# Patient Record
Sex: MALE | Race: WHITE
[De-identification: names, ages, dates, MRNs, and addresses within clinical notes are randomized per-mention and may not be internally consistent; named-entity substitution may affect disease eponyms.]

---

## 2018-09-28 ENCOUNTER — HOSPITAL ENCOUNTER (OUTPATIENT)
Dept: HOSPITAL 6 - RAD | Age: 38
End: 2018-09-28
Attending: FAMILY MEDICINE
Payer: COMMERCIAL

## 2018-09-28 DIAGNOSIS — E04.1: Primary | ICD-10-CM

## 2019-01-14 ENCOUNTER — HOSPITAL ENCOUNTER (OUTPATIENT)
Dept: HOSPITAL 6 - VAS | Age: 39
End: 2019-01-14
Attending: FAMILY MEDICINE
Payer: COMMERCIAL

## 2019-01-14 DIAGNOSIS — I08.1: Primary | ICD-10-CM

## 2019-10-03 ENCOUNTER — HOSPITAL ENCOUNTER (OUTPATIENT)
Dept: HOSPITAL 6 - LAB | Age: 39
End: 2019-10-03
Payer: COMMERCIAL

## 2019-10-03 DIAGNOSIS — R53.83: ICD-10-CM

## 2019-10-03 DIAGNOSIS — L30.9: Primary | ICD-10-CM

## 2019-10-03 DIAGNOSIS — E83.40: ICD-10-CM

## 2019-11-07 ENCOUNTER — HOSPITAL ENCOUNTER (OUTPATIENT)
Dept: HOSPITAL 6 - RAD | Age: 39
End: 2019-11-07
Attending: FAMILY MEDICINE
Payer: COMMERCIAL

## 2019-11-07 DIAGNOSIS — M17.12: Primary | ICD-10-CM

## 2021-05-30 ENCOUNTER — RECORDS - HEALTHEAST (OUTPATIENT)
Dept: ADMINISTRATIVE | Facility: CLINIC | Age: 41
End: 2021-05-30

## 2021-05-31 ENCOUNTER — RECORDS - HEALTHEAST (OUTPATIENT)
Dept: ADMINISTRATIVE | Facility: CLINIC | Age: 41
End: 2021-05-31

## 2021-11-16 ENCOUNTER — APPOINTMENT (OUTPATIENT)
Dept: GENERAL RADIOLOGY | Facility: CLINIC | Age: 41
End: 2021-11-16
Attending: EMERGENCY MEDICINE
Payer: COMMERCIAL

## 2021-11-16 ENCOUNTER — HOSPITAL ENCOUNTER (EMERGENCY)
Facility: CLINIC | Age: 41
Discharge: HOME | End: 2021-11-16
Attending: EMERGENCY MEDICINE | Admitting: EMERGENCY MEDICINE
Payer: COMMERCIAL

## 2021-11-16 VITALS
SYSTOLIC BLOOD PRESSURE: 113 MMHG | BODY MASS INDEX: 25.76 KG/M2 | RESPIRATION RATE: 18 BRPM | HEIGHT: 68 IN | OXYGEN SATURATION: 99 % | TEMPERATURE: 97.6 F | WEIGHT: 170 LBS | DIASTOLIC BLOOD PRESSURE: 73 MMHG | HEART RATE: 73 BPM

## 2021-11-16 DIAGNOSIS — R07.1 CHEST PAIN ON BREATHING: ICD-10-CM

## 2021-11-16 LAB
ANION GAP SERPL CALCULATED.3IONS-SCNC: 5 MMOL/L (ref 3–14)
ATRIAL RATE - MUSE: 77 BPM
BASOPHILS # BLD AUTO: 0 10E3/UL (ref 0–0.2)
BASOPHILS NFR BLD AUTO: 0 %
BUN SERPL-MCNC: 15 MG/DL (ref 7–30)
CALCIUM SERPL-MCNC: 8.9 MG/DL (ref 8.5–10.1)
CHLORIDE BLD-SCNC: 108 MMOL/L (ref 94–109)
CO2 SERPL-SCNC: 26 MMOL/L (ref 20–32)
CREAT SERPL-MCNC: 0.92 MG/DL (ref 0.66–1.25)
DIASTOLIC BLOOD PRESSURE - MUSE: NORMAL MMHG
EOSINOPHIL # BLD AUTO: 0.2 10E3/UL (ref 0–0.7)
EOSINOPHIL NFR BLD AUTO: 2 %
ERYTHROCYTE [DISTWIDTH] IN BLOOD BY AUTOMATED COUNT: 13 % (ref 10–15)
FLUAV RNA SPEC QL NAA+PROBE: NEGATIVE
FLUBV RNA RESP QL NAA+PROBE: NEGATIVE
GFR SERPL CREATININE-BSD FRML MDRD: >90 ML/MIN/1.73M2
GLUCOSE BLD-MCNC: 120 MG/DL (ref 70–99)
HCT VFR BLD AUTO: 45.4 % (ref 40–53)
HGB BLD-MCNC: 14 G/DL (ref 13.3–17.7)
HOLD SPECIMEN: NORMAL
IMM GRANULOCYTES # BLD: 0 10E3/UL
IMM GRANULOCYTES NFR BLD: 0 %
INTERPRETATION ECG - MUSE: NORMAL
LYMPHOCYTES # BLD AUTO: 2.5 10E3/UL (ref 0.8–5.3)
LYMPHOCYTES NFR BLD AUTO: 36 %
MCH RBC QN AUTO: 27.6 PG (ref 26.5–33)
MCHC RBC AUTO-ENTMCNC: 30.8 G/DL (ref 31.5–36.5)
MCV RBC AUTO: 90 FL (ref 78–100)
MONOCYTES # BLD AUTO: 0.5 10E3/UL (ref 0–1.3)
MONOCYTES NFR BLD AUTO: 7 %
NEUTROPHILS # BLD AUTO: 3.8 10E3/UL (ref 1.6–8.3)
NEUTROPHILS NFR BLD AUTO: 55 %
NRBC # BLD AUTO: 0 10E3/UL
NRBC BLD AUTO-RTO: 0 /100
P AXIS - MUSE: -6 DEGREES
PLATELET # BLD AUTO: 269 10E3/UL (ref 150–450)
POTASSIUM BLD-SCNC: 4 MMOL/L (ref 3.4–5.3)
PR INTERVAL - MUSE: 138 MS
QRS DURATION - MUSE: 90 MS
QT - MUSE: 380 MS
QTC - MUSE: 430 MS
R AXIS - MUSE: 41 DEGREES
RBC # BLD AUTO: 5.07 10E6/UL (ref 4.4–5.9)
SARS-COV-2 RNA RESP QL NAA+PROBE: NEGATIVE
SODIUM SERPL-SCNC: 139 MMOL/L (ref 133–144)
SYSTOLIC BLOOD PRESSURE - MUSE: NORMAL MMHG
T AXIS - MUSE: 23 DEGREES
TROPONIN I SERPL-MCNC: <0.015 UG/L (ref 0–0.04)
VENTRICULAR RATE- MUSE: 77 BPM
WBC # BLD AUTO: 7 10E3/UL (ref 4–11)

## 2021-11-16 PROCEDURE — 87636 SARSCOV2 & INF A&B AMP PRB: CPT | Performed by: EMERGENCY MEDICINE

## 2021-11-16 PROCEDURE — 99285 EMERGENCY DEPT VISIT HI MDM: CPT | Mod: 25

## 2021-11-16 PROCEDURE — C9803 HOPD COVID-19 SPEC COLLECT: HCPCS

## 2021-11-16 PROCEDURE — 84484 ASSAY OF TROPONIN QUANT: CPT | Performed by: EMERGENCY MEDICINE

## 2021-11-16 PROCEDURE — 80048 BASIC METABOLIC PNL TOTAL CA: CPT | Performed by: EMERGENCY MEDICINE

## 2021-11-16 PROCEDURE — 85025 COMPLETE CBC W/AUTO DIFF WBC: CPT | Performed by: EMERGENCY MEDICINE

## 2021-11-16 PROCEDURE — 93005 ELECTROCARDIOGRAM TRACING: CPT

## 2021-11-16 PROCEDURE — 82374 ASSAY BLOOD CARBON DIOXIDE: CPT | Performed by: EMERGENCY MEDICINE

## 2021-11-16 PROCEDURE — 71046 X-RAY EXAM CHEST 2 VIEWS: CPT

## 2021-11-16 PROCEDURE — 36415 COLL VENOUS BLD VENIPUNCTURE: CPT | Performed by: EMERGENCY MEDICINE

## 2021-11-16 ASSESSMENT — MIFFLIN-ST. JEOR: SCORE: 1660.61

## 2021-11-16 NOTE — ED PROVIDER NOTES
"  History     Chief Complaint:    Shortness of Breath      HPI   Lew Moreland is a 41 year old male who presents with chest heaviness and shortness of breath.    Patient is a 41-year-old male who is otherwise healthy. Patient states he was in his otherwise normal state of health until but up until around 11 PM this evening. Patient developed an episode of chest heaviness in the feeling shortness of breath. Patient states he has had slight pain in the right lower chest. Sometimes it is worse when he breathes. He feels pressure and short of breath. Patient denies recent surgery history of prior DVT calf pain or swelling. Symptoms have gradually improved since the time of onset. Patient presents to the emergency room for further assessment. No cough or fever denies smoking.    Review of Systems  Positive chest pain positive shortness of breath negative for fever chills never negative for hemoptysis negative for vomiting or diarrhea all the systems negative except as above    Allergies:    Neosporin [Neomycin-Polymyx-Gramicid]      Medications:      NO ACTIVE MEDICATIONS        Past Medical History:      No past medical history on file.  There are no problems to display for this patient.       Past Surgical History:      Past Surgical History:   Procedure Laterality Date     ORTHOPEDIC SURGERY         Family History:      The patient denies past family history.  No family history on file.    Social History:  Denies smoking or regular alcohol use. Occasionally does [employer] and [employer] exercise.    Physical Exam     Patient Vitals for the past 24 hrs:   BP Temp Temp src Pulse Resp SpO2 Height Weight   11/16/21 0323 (!) 122/93 97.6  F (36.4  C) Temporal 93 18 99 % 1.727 m (5' 8\") 77.1 kg (170 lb)       Physical Exam  HENT:      Head: Normocephalic.      Mouth/Throat:      Mouth: Mucous membranes are moist.   Eyes:      Pupils: Pupils are equal, round, and reactive to light.   Cardiovascular:      Rate and Rhythm: Normal rate " and regular rhythm.   Pulmonary:      Effort: Pulmonary effort is normal.      Breath sounds: Normal breath sounds.   Abdominal:      General: Bowel sounds are normal.      Palpations: Abdomen is soft.   Musculoskeletal:         General: Normal range of motion.      Cervical back: Normal range of motion.   Skin:     General: Skin is warm.      Capillary Refill: Capillary refill takes less than 2 seconds.   Neurological:      General: No focal deficit present.      Mental Status: He is alert.   Psychiatric:         Mood and Affect: Mood normal.           Emergency Department Course   ECG:  ECG taken at 0331, ECG read at 0400    Rate 77 bpm. CA interval 138 ms. QRS duration 90 ms. QT/QTc 380/430 ms. sinus rhythm.    Imaging:  Chest XR,  PA & LAT   Final Result   IMPRESSION: No evidence of active cardiopulmonary disease.                         Laboratory:  Labs Ordered and Resulted from Time of ED Arrival to Time of ED Departure   BASIC METABOLIC PANEL - Abnormal       Result Value    Sodium 139      Potassium 4.0      Chloride 108      Carbon Dioxide (CO2) 26      Anion Gap 5      Urea Nitrogen 15      Creatinine 0.92      Calcium 8.9      Glucose 120 (*)     GFR Estimate >90     CBC WITH PLATELETS AND DIFFERENTIAL - Abnormal    WBC Count 7.0      RBC Count 5.07      Hemoglobin 14.0      Hematocrit 45.4      MCV 90      MCH 27.6      MCHC 30.8 (*)     RDW 13.0      Platelet Count 269      % Neutrophils 55      % Lymphocytes 36      % Monocytes 7      % Eosinophils 2      % Basophils 0      % Immature Granulocytes 0      NRBCs per 100 WBC 0      Absolute Neutrophils 3.8      Absolute Lymphocytes 2.5      Absolute Monocytes 0.5      Absolute Eosinophils 0.2      Absolute Basophils 0.0      Absolute Immature Granulocytes 0.0      Absolute NRBCs 0.0     TROPONIN I - Normal    Troponin I <0.015     INFLUENZA A/B & SARS-COV2 PCR MULTIPLEX - Normal    Influenza A target Negative      Influenza B target Negative      SARS  CoV2 PCR Negative             Emergency Department Course:    Reviewed:    I reviewed nursing notes and vitals    Assessments:  0400 I obtained history and examined the patient as noted above.   0430 I rechecked the patient and explained findings.       Consults:              Disposition:  The patient was discharged to home.    Impression & Plan        Medical Decision Making:  Patient presents with vague chest heaviness and subjective feeling of shortness of breath patient is talking in normal sentences has normal vital signs with a normal heart rate and a normal oxygen level. Patient is low if no risk of coronary artery disease EKG and troponin are negative. Patient has no risk factors for PE and is PERC negative and therefore investigation into pulmonary embolism is not recommended. Chest x-ray is negative for the pneumothorax or pneumonia. Covid testing was performed at triage and also negative. Care was discussed with the patient suspect suspect musculoskeletal pain. Patient does give a history of plenty of exertional activity without concerns for chest pain with exertion or shortness of breath with exertion and I offered rereassurance and discharge patient asked to return if severe increase in shortness of breath fever or severe increase in pain.    Covid-19  Lew Moreland was evaluated during a global COVID-19 pandemic, which necessitated consideration that the patient might be at risk for infection with the SARS-CoV-2 virus that causes COVID-19.   Applicable protocols for evaluation were followed during the patient's care.   COVID-19 was considered as part of the patient's evaluation. The plan for testing is:  a test was obtained during this visit.      Diagnosis:    ICD-10-CM    1. Chest pain on breathing  R07.1        Discharge Medications:  Discharge Medication List as of 11/16/2021  5:33 AM            Scribe Disclosure:  David HEBERT MD, am serving as a scribe at 5:22 AM on 11/16/2021 to  document services personally performed by David Francisco MD based on my observations and the provider's statements to me.      David Francisco MD  11/16/21 8903

## 2021-11-16 NOTE — DISCHARGE INSTRUCTIONS
We have done extensive work-up for chest pain including x-ray and lab work.  These are all normal.  Please take ibuprofen for discomfort as pain when you breathe can be pleurisy.  If you develop severe increase in shortness of breath or severe increase in pain return to the emergency room for reassessment.  Please follow-up with your regular doctor if you continue to have chest pain on and off.

## 2021-11-16 NOTE — ED TRIAGE NOTES
Here for sob started this evening and getting worse associated with right sided chest pain that hurts more with breathing in, right sided abdominal, and chills. ABCs intact.

## 2021-12-07 ENCOUNTER — IMMUNIZATION (OUTPATIENT)
Dept: NURSING | Facility: CLINIC | Age: 41
End: 2021-12-07
Payer: COMMERCIAL

## 2021-12-07 DIAGNOSIS — Z23 HIGH PRIORITY FOR 2019-NCOV VACCINE: Primary | ICD-10-CM

## 2021-12-07 PROCEDURE — 0004A COVID-19,PF,PFIZER (12+ YRS): CPT

## 2021-12-07 PROCEDURE — 91300 COVID-19,PF,PFIZER (12+ YRS): CPT

## 2021-12-07 PROCEDURE — 99207 PR NO CHARGE LOS: CPT

## 2022-03-21 ENCOUNTER — APPOINTMENT (OUTPATIENT)
Dept: CT IMAGING | Facility: CLINIC | Age: 42
End: 2022-03-21
Attending: EMERGENCY MEDICINE
Payer: COMMERCIAL

## 2022-03-21 ENCOUNTER — HOSPITAL ENCOUNTER (EMERGENCY)
Facility: CLINIC | Age: 42
Discharge: HOME | End: 2022-03-22
Attending: EMERGENCY MEDICINE | Admitting: EMERGENCY MEDICINE
Payer: COMMERCIAL

## 2022-03-21 DIAGNOSIS — N28.9 RENAL LESION: ICD-10-CM

## 2022-03-21 DIAGNOSIS — R10.9 RIGHT FLANK PAIN: ICD-10-CM

## 2022-03-21 LAB
ALBUMIN SERPL-MCNC: 3.9 G/DL (ref 3.4–5)
ALBUMIN UR-MCNC: NEGATIVE MG/DL
ALP SERPL-CCNC: 88 U/L (ref 40–150)
ALT SERPL W P-5'-P-CCNC: 27 U/L (ref 0–70)
ANION GAP SERPL CALCULATED.3IONS-SCNC: 2 MMOL/L (ref 3–14)
APPEARANCE UR: CLEAR
AST SERPL W P-5'-P-CCNC: 21 U/L (ref 0–45)
BASOPHILS # BLD AUTO: 0 10E3/UL (ref 0–0.2)
BASOPHILS NFR BLD AUTO: 0 %
BILIRUB SERPL-MCNC: 0.6 MG/DL (ref 0.2–1.3)
BILIRUB UR QL STRIP: NEGATIVE
BUN SERPL-MCNC: 14 MG/DL (ref 7–30)
CALCIUM SERPL-MCNC: 9.4 MG/DL (ref 8.5–10.1)
CHLORIDE BLD-SCNC: 105 MMOL/L (ref 94–109)
CO2 SERPL-SCNC: 28 MMOL/L (ref 20–32)
COLOR UR AUTO: YELLOW
CREAT SERPL-MCNC: 1.03 MG/DL (ref 0.66–1.25)
EOSINOPHIL # BLD AUTO: 0.2 10E3/UL (ref 0–0.7)
EOSINOPHIL NFR BLD AUTO: 2 %
ERYTHROCYTE [DISTWIDTH] IN BLOOD BY AUTOMATED COUNT: 12.7 % (ref 10–15)
GFR SERPL CREATININE-BSD FRML MDRD: >90 ML/MIN/1.73M2
GLUCOSE BLD-MCNC: 97 MG/DL (ref 70–99)
GLUCOSE UR STRIP-MCNC: NEGATIVE MG/DL
HCT VFR BLD AUTO: 46.2 % (ref 40–53)
HGB BLD-MCNC: 14.4 G/DL (ref 13.3–17.7)
HGB UR QL STRIP: NEGATIVE
HOLD SPECIMEN: NORMAL
HOLD SPECIMEN: NORMAL
IMM GRANULOCYTES # BLD: 0 10E3/UL
IMM GRANULOCYTES NFR BLD: 0 %
KETONES UR STRIP-MCNC: NEGATIVE MG/DL
LEUKOCYTE ESTERASE UR QL STRIP: NEGATIVE
LYMPHOCYTES # BLD AUTO: 2.5 10E3/UL (ref 0.8–5.3)
LYMPHOCYTES NFR BLD AUTO: 35 %
MCH RBC QN AUTO: 27.3 PG (ref 26.5–33)
MCHC RBC AUTO-ENTMCNC: 31.2 G/DL (ref 31.5–36.5)
MCV RBC AUTO: 88 FL (ref 78–100)
MONOCYTES # BLD AUTO: 0.5 10E3/UL (ref 0–1.3)
MONOCYTES NFR BLD AUTO: 7 %
NEUTROPHILS # BLD AUTO: 3.9 10E3/UL (ref 1.6–8.3)
NEUTROPHILS NFR BLD AUTO: 56 %
NITRATE UR QL: NEGATIVE
NRBC # BLD AUTO: 0 10E3/UL
NRBC BLD AUTO-RTO: 0 /100
PH UR STRIP: 6 [PH] (ref 5–7)
PLATELET # BLD AUTO: 308 10E3/UL (ref 150–450)
POTASSIUM BLD-SCNC: 3.8 MMOL/L (ref 3.4–5.3)
PROT SERPL-MCNC: 8.1 G/DL (ref 6.8–8.8)
RBC # BLD AUTO: 5.28 10E6/UL (ref 4.4–5.9)
SODIUM SERPL-SCNC: 135 MMOL/L (ref 133–144)
SP GR UR STRIP: 1.03 (ref 1–1.03)
UROBILINOGEN UR STRIP-MCNC: 4 MG/DL
WBC # BLD AUTO: 7.1 10E3/UL (ref 4–11)

## 2022-03-21 PROCEDURE — 85025 COMPLETE CBC W/AUTO DIFF WBC: CPT | Performed by: EMERGENCY MEDICINE

## 2022-03-21 PROCEDURE — 36415 COLL VENOUS BLD VENIPUNCTURE: CPT | Performed by: EMERGENCY MEDICINE

## 2022-03-21 PROCEDURE — 74177 CT ABD & PELVIS W/CONTRAST: CPT

## 2022-03-21 PROCEDURE — 81003 URINALYSIS AUTO W/O SCOPE: CPT | Performed by: EMERGENCY MEDICINE

## 2022-03-21 PROCEDURE — 82435 ASSAY OF BLOOD CHLORIDE: CPT | Performed by: EMERGENCY MEDICINE

## 2022-03-21 PROCEDURE — 250N000011 HC RX IP 250 OP 636: Performed by: EMERGENCY MEDICINE

## 2022-03-21 PROCEDURE — 80053 COMPREHEN METABOLIC PANEL: CPT | Performed by: EMERGENCY MEDICINE

## 2022-03-21 PROCEDURE — 99285 EMERGENCY DEPT VISIT HI MDM: CPT | Mod: 25

## 2022-03-21 RX ORDER — IOPAMIDOL 755 MG/ML
500 INJECTION, SOLUTION INTRAVASCULAR ONCE
Status: COMPLETED | OUTPATIENT
Start: 2022-03-21 | End: 2022-03-21

## 2022-03-21 RX ADMIN — IOPAMIDOL 85 ML: 755 INJECTION, SOLUTION INTRAVENOUS at 23:03

## 2022-03-22 ENCOUNTER — APPOINTMENT (OUTPATIENT)
Dept: ULTRASOUND IMAGING | Facility: CLINIC | Age: 42
End: 2022-03-22
Attending: EMERGENCY MEDICINE
Payer: COMMERCIAL

## 2022-03-22 VITALS
OXYGEN SATURATION: 100 % | SYSTOLIC BLOOD PRESSURE: 109 MMHG | TEMPERATURE: 97.9 F | HEART RATE: 77 BPM | RESPIRATION RATE: 20 BRPM | DIASTOLIC BLOOD PRESSURE: 75 MMHG

## 2022-03-22 PROCEDURE — 76870 US EXAM SCROTUM: CPT

## 2022-03-22 NOTE — ED TRIAGE NOTES
Pt presents with right sided abdominal and flank pain x2-3 days. Was seen at urgent care and sent to ER for kidney stone eval.

## 2022-03-22 NOTE — DISCHARGE INSTRUCTIONS
Please follow-up with your primary care provider in 2 to 3 days for recheck.  You may use Tylenol/ibuprofen.  Some of your pain may be coming from increased stool in your colon.  Please begin MiraLAX to ensure soft stool.    Return to the ED if you develop worsened pain, vomiting, fevers or any other concerns.    Discharge Instructions  Abdominal Pain    Abdominal pain (belly pain) can be caused by many things. Your evaluation today does not show the exact cause for your pain. Your provider today has decided that it is unlikely your pain is due to a life threatening problem, or a problem requiring surgery or hospital admission. Sometimes those problems cannot be found right away, so it is very important that you follow up as directed.  Sometimes only the changes which occur over time allow the cause of your pain to be found.    Generally, every Emergency Department visit should have a follow-up clinic visit with either a primary or a specialty clinic/provider. Please follow-up as instructed by your emergency provider today. With abdominal pain, we often recommend very close follow-up, such as the following day.    ADULTS:  Return to the Emergency Department right away if:    You get an oral temperature above 102oF or as directed by your provider.  You have blood in your stools. This may be bright red or appear as black, tarry stools.    You keep vomiting (throwing up) or cannot drink liquids.  You see blood when you vomit.   You cannot have a bowel movement or you cannot pass gas.  Your stomach gets bloated or bigger.  Your skin or the whites of your eyes look yellow.  You faint.  You have bloody, frequent or painful urination (peeing).  You have new symptoms or anything that worries you.    CHILDREN:  Return to the Emergency Department right away if your child has any of the above-listed symptoms or the following:    Pushes your hand away or screams/cries when his/her belly is touched.  You notice your child is  very fussy or weak.  Your child is very tired and is too tired to eat or drink.  Your child is dehydrated.  Signs of dehydration can be:  Significant change in the amount of wet diapers/urine.  Your infant or child starts to have dry mouth and lips, or no saliva (spit) or tears.    PREGNANT WOMEN:  Return to the Emergency Department right away if you have any of the above-listed symptoms or the following:    You have bleeding, leaking fluid or passing tissue from the vagina.  You have worse pain or cramping, or pain in your shoulder or back.  You have vomiting that will not stop.  You have a temperature of 100oF or more.  Your baby is not moving as much as usual.  You faint.  You get a bad headache with or without eye problems and abdominal pain.  You have a seizure.  You have unusual discharge from your vagina and abdominal pain.    Abdominal pain is pretty common during pregnancy.  Your pain may or may not be related to your pregnancy. You should follow-up closely with your OB provider so they can evaluate you and your baby.  Until you follow-up with your regular provider, do the following:     Avoid sex and do not put anything in your vagina.  Drink clear fluids.  Only take medications approved by your provider.    MORE INFORMATION:    Appendicitis:  A possible cause of abdominal pain in any person who still has their appendix is acute appendicitis. Appendicitis is often hard to diagnose.  Testing does not always rule out early appendicitis or other causes of abdominal pain. Close follow-up with your provider and re-evaluations may be needed to figure out the reason for your abdominal pain.    Follow-up:  It is very important that you make an appointment with your clinic and go to the appointment.  If you do not follow-up with your primary provider, it may result in missing an important development which could result in permanent injury or disability and/or lasting pain.  If there is any problem keeping your  "appointment, call your provider or return to the Emergency Department.    Medications:  Take your medications as directed by your provider today.  Before using over-the-counter medications, ask your provider and make sure to take the medications as directed.  If you have any questions about medications, ask your provider.    Diet:  Resume your normal diet as much as possible, but do not eat fried, fatty or spicy foods while you have pain.  Do not drink alcohol or have caffeine.  Do not smoke tobacco.    Probiotics: If you have been given an antibiotic, you may want to also take a probiotic pill or eat yogurt with live cultures. Probiotics have \"good bacteria\" to help your intestines stay healthy. Studies have shown that probiotics help prevent diarrhea (loose stools) and other intestine problems (including C. diff infection) when you take antibiotics. You can buy these without a prescription in the pharmacy section of the store.     If you were given a prescription for medicine here today, be sure to read all of the information (including the package insert) that comes with your prescription.  This will include important information about the medicine, its side effects, and any warnings that you need to know about.  The pharmacist who fills the prescription can provide more information and answer questions you may have about the medicine.  If you have questions or concerns that the pharmacist cannot address, please call or return to the Emergency Department.       Remember that you can always come back to the Emergency Department if you are not able to see your regular provider in the amount of time listed above, if you get any new symptoms, or if there is anything that worries you.    "

## 2022-03-22 NOTE — ED PROVIDER NOTES
History     Chief Complaint:  Flank Pain     HPI:  Lew Moreland is a 41 year old male who presents with right flank pain.  Patient reports symptoms began 2 days previously.  Pain was initially intermittent, now has become more persistent.  Pain is located to the right lower quadrant.  He notes associated nausea without vomiting, as well as loss of appetite.  He was initially evaluated at urgent care, though referred to the ED for further evaluation.  He denies associated urinary symptoms.  No history of pain similar to this in the past.  No prior abdominal surgeries.  Notes some radiation of pain to his proximal thigh, but denies any scrotal or testicular pain.  He declines analgesia.  Notes his stool has been not very regular.  Sexually active with one female partner.  Denies prior hx of STI, and does not feel at risk for STI.    Allergies:  Neosporin [Neomycin-Polymyx-Gramicid]     Medications:    NO ACTIVE MEDICATIONS      Past Medical History:    Otherwise healthy    Past Surgical History:    Past Surgical History:   Procedure Laterality Date     ORTHOPEDIC SURGERY          Family History:    family history is not on file.    Social History:   reports that he has quit smoking. He does not have any smokeless tobacco history on file. He reports current alcohol use. He reports that he does not use drugs.    Review of Systems:  10 point ROS is negative aside from that mentioned in the HPI      Physical Exam     Patient Vitals for the past 24 hrs:   BP Temp Temp src Pulse Resp SpO2   03/22/22 0030 109/75 -- -- 77 -- 100 %   03/21/22 1933 126/82 97.9  F (36.6  C) Temporal 74 20 100 %      General:   Well-nourished   Speaking in full sentences  Eyes:   Conjunctiva without injection or scleral icterus  ENT:   Moist mucous membranes   Nares patent   Pinnae normal  Neck:   Full ROM   No stiffness appreciated  Resp:   Lungs CTAB   No crackles, wheezing or audible rubs   Good air movement  CV:    Normal rate, regular  rhythm   S1 and S2 present   No murmur, gallop or rub  GI:   BS present   Abdomen soft without distention   Tenderness to palpation to RLQ   Positive obturator sign   Positive psoas sign   No guarding or rebound tenderness  :   Circumcised male   No testicular or scrotal tenderness/swelling  Skin:   Warm, dry, well perfused   No rashes or open wounds on exposed skin  MSK:   Moves all extremities   No focal deformities or swelling  Neuro:   Alert   Answers questions appropriately   Moves all extremities equally   Gait stable  Psych:   Normal affect, normal mood    Emergency Department Course   Imaging:  Radiology findings were communicated with the patient who voiced understanding of the findings.  CT Abdomen Pelvis w Contrast   Final Result   IMPRESSION:    1.  No inflammatory change, bowel obstruction or abscess. Normal appendix.         Laboratory:  Laboratory findings were communicated with the patient who voiced understanding of the findings.  Labs Ordered and Resulted from Time of ED Arrival to Time of ED Departure   COMPREHENSIVE METABOLIC PANEL - Abnormal       Result Value    Sodium 135      Potassium 3.8      Chloride 105      Carbon Dioxide (CO2) 28      Anion Gap 2 (*)     Urea Nitrogen 14      Creatinine 1.03      Calcium 9.4      Glucose 97      Alkaline Phosphatase 88      AST 21      ALT 27      Protein Total 8.1      Albumin 3.9      Bilirubin Total 0.6      GFR Estimate >90     URINE MACROSCOPIC WITH REFLEX TO MICRO - Abnormal    Color Urine Yellow      Appearance Urine Clear      Glucose Urine Negative      Bilirubin Urine Negative      Ketones Urine Negative      Specific Gravity Urine 1.026      Blood Urine Negative      pH Urine 6.0      Protein Albumin Urine Negative      Urobilinogen Urine 4.0 (*)     Nitrite Urine Negative      Leukocyte Esterase Urine Negative     CBC WITH PLATELETS AND DIFFERENTIAL - Abnormal    WBC Count 7.1      RBC Count 5.28      Hemoglobin 14.4      Hematocrit 46.2       MCV 88      MCH 27.3      MCHC 31.2 (*)     RDW 12.7      Platelet Count 308      % Neutrophils 56      % Lymphocytes 35      % Monocytes 7      % Eosinophils 2      % Basophils 0      % Immature Granulocytes 0      NRBCs per 100 WBC 0      Absolute Neutrophils 3.9      Absolute Lymphocytes 2.5      Absolute Monocytes 0.5      Absolute Eosinophils 0.2      Absolute Basophils 0.0      Absolute Immature Granulocytes 0.0      Absolute NRBCs 0.0        Interventions:  Medications   sodium chloride (PF) 0.9% PF flush 100 mL (64 mLs Intravenous Given 3/21/22 2303)   iopamidol (ISOVUE-370) solution 500 mL (85 mLs Intravenous Given 3/21/22 2303)        Emergency Department Course / Reassessment:  Nursing notes and vitals reviewed.  9:54 PM: I performed an exam of the patient as documented above.      The patient was sent for CT while in the emergency department, results above.      ED Course as of 03/22/22 0915   Mon Mar 21, 2022   2329 Patient re-evaluated        Patient signed out to Dr. Jung pending testicular US.  Disposition pending.    Impression & Plan        Medical Decision Making:  Lew Moreland is a 41 year old male who presents to the ER for evaluation of abdominal pain.  Vital signs on presentation reveal no acute abnormalities.  History, exam, and ED course as outlined above.  Broad differential diagnosis considered including though not limited to, acute appendicitis, mesenteric adenitis, ureteral colic, UTI, pyelonephritis, constipation, perforation, bowel obstruction, referred testicular pathology (torsion, orchitis, epididymitis), zoster, among others.  Initial work-up included CT of the abdomen/pelvis in light of patient's focal right lower quadrant pain.  Fortunately, no evidence of acute appendicitis, ureteral stone, or other acute pathology is identified.  Patient was informed of the incidentally noted renal lesion, which does appear stable compared with previous CT imaging from 2014.  This can  be further monitored as an outpatient. His abdominal exam is soft and without peritoneal findings on initial and repeat examination.  Doubt renal infarct. Urinalysis not suggestive of infection.  Note made of urobilinogen, though serum bilirubin is normal.  CBC/CMP otherwise unremarkable.  Patient does acknowledge mild radiation of pain towards the scrotum/testicle, for which referred testicular pathology is considered.  We will plan for ultrasound of the scrotum/testicles, the results of which are presently pending.  Patient will be signed out to my partner, Dr. Orta, of the overnight shift, who has graciously agreed to follow-up on results of testing and ultimate disposition.  Should this return unremarkable, anticipate discharge home with supportive outpatient treatment.  On review of imaging, I do appreciate stool in the ascending colon on the right side, which may be contributing to the patient's symptoms.  He does acknowledge decreased stool frequency, and may begin MiraLAX to assist with stool regularity.  Questions answered prior to transfer of care.    Diagnosis:    ICD-10-CM    1. Right flank pain  R10.9    2. Renal lesion  N28.9           3/21/2022   David Forrester MD Roach, Brian Donald, MD  03/22/22 0931

## 2022-09-08 ENCOUNTER — OFFICE VISIT (OUTPATIENT)
Dept: URGENT CARE | Facility: URGENT CARE | Age: 42
End: 2022-09-08
Payer: COMMERCIAL

## 2022-09-08 VITALS
SYSTOLIC BLOOD PRESSURE: 120 MMHG | BODY MASS INDEX: 24.48 KG/M2 | OXYGEN SATURATION: 99 % | HEART RATE: 67 BPM | WEIGHT: 161 LBS | TEMPERATURE: 98.3 F | DIASTOLIC BLOOD PRESSURE: 63 MMHG

## 2022-09-08 DIAGNOSIS — R10.31 ABDOMINAL PAIN, RIGHT LOWER QUADRANT: Primary | ICD-10-CM

## 2022-09-08 DIAGNOSIS — K59.00 CONSTIPATION, UNSPECIFIED CONSTIPATION TYPE: ICD-10-CM

## 2022-09-08 LAB
ALBUMIN SERPL BCG-MCNC: 4.4 G/DL (ref 3.5–5.2)
ALBUMIN UR-MCNC: NEGATIVE MG/DL
ALP SERPL-CCNC: 82 U/L (ref 40–129)
ALT SERPL W P-5'-P-CCNC: 18 U/L (ref 10–50)
ANION GAP SERPL CALCULATED.3IONS-SCNC: 7 MMOL/L (ref 7–15)
APPEARANCE UR: CLEAR
AST SERPL W P-5'-P-CCNC: 25 U/L (ref 10–50)
BACTERIA #/AREA URNS HPF: NORMAL /HPF
BILIRUB SERPL-MCNC: 0.7 MG/DL
BILIRUB UR QL STRIP: NEGATIVE
BUN SERPL-MCNC: 9.8 MG/DL (ref 6–20)
CALCIUM SERPL-MCNC: 9.6 MG/DL (ref 8.6–10)
CHLORIDE SERPL-SCNC: 100 MMOL/L (ref 98–107)
COLOR UR AUTO: YELLOW
CREAT SERPL-MCNC: 0.97 MG/DL (ref 0.67–1.17)
DEPRECATED HCO3 PLAS-SCNC: 30 MMOL/L (ref 22–29)
ERYTHROCYTE [DISTWIDTH] IN BLOOD BY AUTOMATED COUNT: 14 % (ref 10–15)
ERYTHROCYTE [SEDIMENTATION RATE] IN BLOOD BY WESTERGREN METHOD: 7 MM/HR (ref 0–15)
GFR SERPL CREATININE-BSD FRML MDRD: >90 ML/MIN/1.73M2
GLUCOSE SERPL-MCNC: 90 MG/DL (ref 70–99)
GLUCOSE UR STRIP-MCNC: NEGATIVE MG/DL
HCT VFR BLD AUTO: 43.1 % (ref 40–53)
HGB BLD-MCNC: 13.9 G/DL (ref 13.3–17.7)
HGB UR QL STRIP: ABNORMAL
KETONES UR STRIP-MCNC: NEGATIVE MG/DL
LEUKOCYTE ESTERASE UR QL STRIP: NEGATIVE
MCH RBC QN AUTO: 27.9 PG (ref 26.5–33)
MCHC RBC AUTO-ENTMCNC: 32.3 G/DL (ref 31.5–36.5)
MCV RBC AUTO: 86 FL (ref 78–100)
NITRATE UR QL: NEGATIVE
PH UR STRIP: 5.5 [PH] (ref 5–7)
PLATELET # BLD AUTO: 252 10E3/UL (ref 150–450)
POTASSIUM SERPL-SCNC: 4.1 MMOL/L (ref 3.4–5.3)
PROT SERPL-MCNC: 7.6 G/DL (ref 6.4–8.3)
RBC # BLD AUTO: 4.99 10E6/UL (ref 4.4–5.9)
RBC #/AREA URNS AUTO: NORMAL /HPF
SODIUM SERPL-SCNC: 137 MMOL/L (ref 136–145)
SP GR UR STRIP: 1.02 (ref 1–1.03)
UROBILINOGEN UR STRIP-ACNC: 0.2 E.U./DL
WBC # BLD AUTO: 6 10E3/UL (ref 4–11)
WBC #/AREA URNS AUTO: NORMAL /HPF

## 2022-09-08 PROCEDURE — 85652 RBC SED RATE AUTOMATED: CPT | Performed by: PHYSICIAN ASSISTANT

## 2022-09-08 PROCEDURE — 85027 COMPLETE CBC AUTOMATED: CPT | Performed by: PHYSICIAN ASSISTANT

## 2022-09-08 PROCEDURE — 99204 OFFICE O/P NEW MOD 45 MIN: CPT | Performed by: PHYSICIAN ASSISTANT

## 2022-09-08 PROCEDURE — 36415 COLL VENOUS BLD VENIPUNCTURE: CPT | Performed by: PHYSICIAN ASSISTANT

## 2022-09-08 PROCEDURE — 80053 COMPREHEN METABOLIC PANEL: CPT | Performed by: PHYSICIAN ASSISTANT

## 2022-09-08 PROCEDURE — 81001 URINALYSIS AUTO W/SCOPE: CPT | Performed by: PHYSICIAN ASSISTANT

## 2022-09-08 NOTE — PROGRESS NOTES
Assessment & Plan     Abdominal pain, right lower quadrant  Given patient's previous recurrent symptoms, with slight increase in intensity compared to previous, I have lower suspicion today for possibility of more serious etiology.  That said, cannot completely exclude which was discussed at length with the patient.  After shared decision-making, we agreed to review CBC, CMP, ESR, and abdominal radiograph to evaluate.  On diagnostic testing today no elevated markers for inflammation/infection which is somewhat reassuring.  Radiograph was likewise without acute findings.  CMP without concerning findings.  Following work-up reviewed findings with patient and advised that while we cannot completely exclude renal lithiasis, appendicitis, or other more serious etiology, that he could consider attempting treatment for constipation as previously advised by gastroenterology specialist.  Advised if some improvement to have further follow-up with GI as previously recommended.  The patient has any significant worsening of symptoms, advised him to go to emergency department for reevaluation.  - UA Macro with Reflex to Micro and Culture - lab collect  - Urine Microscopic  - CBC with platelets  - Comprehensive metabolic panel (BMP + Alb, Alk Phos, ALT, AST, Total. Bili, TP)  - XR Abdomen 2 Views  - ESR: Erythrocyte sedimentation rate    Constipation, unspecified constipation type  Retry the Magnesium Citrate 296mL bottle. Drink half of the bottle one day. Drink the second half of bottle the next day. Drink lots of water to stay well hydrated.     I spent a total of 45 minutes on the day of the visit.   Time spent doing chart review, history and exam, documentation and further activities per the note    Return in about 1 week (around 9/15/2022) for reevaluation with PCP if symptoms not improving, return earlier if symptoms are worsening.    Mil Spaulding PA-C  [facility] is a  42 year old male who presents to clinic today for the following health issues:  Chief Complaint   Patient presents with     Urgent Care     X1 Week pain on right side radiating to back, ache and stabbing pain when moving, not able to sleep well waking up with pain   Constipation problems sometimes, not in the last few days   No meds tried      HPI  Patient is a 42-year-old male with a history notable for constipation and previous recurrent abdominal pain presenting to urgent care for evaluation of 1 week of right lower quadrant abdominal pain.  Patient notes that the pain is sharp and stabbing, somewhat intermittent, and with concurrent pain in the right flank.  Patient notes that pain is similar to previous episodes that he has had that were related to constipation, but notes that symptoms today are more intense, and have woken him up 2 nights in a row which he states is new.  Does report that he has been more constipated in the last week, typically has a single bowel movement daily with scant to small hard dino of stool.  Notes some mild associated nausea postprandially, but that this resolved without further treatment.  He denies any emesis.  Has tried taking 1 dose of MiraLAX without symptomatic improvement, and has been using an OTC probiotic.  He denies any recent trauma, fever/chills, dysuria, hematuria, chest pain, dyspnea, skin changes or rash, or other complaints.      Review of Systems  Focused ROS obtained, pertinent positives/negatives reviewed in the HPI.       Objective    /63   Pulse 67   Temp 98.3  F (36.8  C) (Tympanic)   Wt 73 kg (161 lb)   SpO2 99%   BMI 24.48 kg/m    Physical Exam   GENERAL: healthy, alert and no distress  RESP: lungs clear to auscultation - no rales, rhonchi or wheezes  CV: regular rates and rhythm, peripheral pulses strong and no peripheral edema  ABDOMEN: bowel sounds normal, abdomen soft without peritoneal signs of irritation, right lower quadrant focal  tenderness, no rebound or guarding, positive Rovsing sign.  No CVA tenderness.   (male): Deferred  SKIN: no suspicious lesions or rashes      Xray - Reviewed and interpreted by me.  No free air under diaphragm, no apparent signs of obstruction or mass, no appreciable ureteral stones.    Narrative & Impression   XR ABDOMEN 2 VIEWS   9/8/2022 12:43 PM      HISTORY: RLQ abdominal pain, trace blood in urine, eval for possible  stone (hx of same symptoms with known constipation); Abdominal pain,  right lower quadrant     COMPARISON: 3/21/2022                                                                      IMPRESSION: No free air. Nonobstructive bowel gas pattern. Moderate  amount of formed stool throughout the colon. No radiographically  evident renal calculi. Small pelvic phlebolith, stable.         Results for orders placed or performed in visit on 09/08/22 (from the past 24 hour(s))   UA Macro with Reflex to Micro and Culture - lab collect    Specimen: Urine, Midstream   Result Value Ref Range    Color Urine Yellow Colorless, Straw, Light Yellow, Yellow    Appearance Urine Clear Clear    Glucose Urine Negative Negative mg/dL    Bilirubin Urine Negative Negative    Ketones Urine Negative Negative mg/dL    Specific Gravity Urine 1.020 1.003 - 1.035    Blood Urine Trace (A) Negative    pH Urine 5.5 5.0 - 7.0    Protein Albumin Urine Negative Negative mg/dL    Urobilinogen Urine 0.2 0.2, 1.0 E.U./dL    Nitrite Urine Negative Negative    Leukocyte Esterase Urine Negative Negative   Urine Microscopic   Result Value Ref Range    Bacteria Urine None Seen None Seen /HPF    RBC Urine 0-2 0-2 /HPF /HPF    WBC Urine None Seen 0-5 /HPF /HPF    Narrative    Urine Culture not indicated   CBC with platelets   Result Value Ref Range    WBC Count 6.0 4.0 - 11.0 10e3/uL    RBC Count 4.99 4.40 - 5.90 10e6/uL    Hemoglobin 13.9 13.3 - 17.7 g/dL    Hematocrit 43.1 40.0 - 53.0 %    MCV 86 78 - 100 fL    MCH 27.9 26.5 - 33.0 pg     MCHC 32.3 31.5 - 36.5 g/dL    RDW 14.0 10.0 - 15.0 %    Platelet Count 252 150 - 450 10e3/uL   ESR: Erythrocyte sedimentation rate   Result Value Ref Range    Erythrocyte Sedimentation Rate 7 0 - 15 mm/hr   Comprehensive metabolic panel   Result Value Ref Range    Sodium 137 136 - 145 mmol/L    Potassium 4.1 3.4 - 5.3 mmol/L    Creatinine 0.97 0.67 - 1.17 mg/dL    Urea Nitrogen 9.8 6.0 - 20.0 mg/dL    Chloride 100 98 - 107 mmol/L    Carbon Dioxide (CO2) 30 (H) 22 - 29 mmol/L    Anion Gap 7 7 - 15 mmol/L    Glucose 90 70 - 99 mg/dL    Calcium 9.6 8.6 - 10.0 mg/dL    Protein Total 7.6 6.4 - 8.3 g/dL    Albumin 4.4 3.5 - 5.2 g/dL    Bilirubin Total 0.7 <=1.2 mg/dL    Alkaline Phosphatase 82 40 - 129 U/L    AST 25 10 - 50 U/L    ALT 18 10 - 50 U/L    GFR Estimate >90 >60 mL/min/1.73m2